# Patient Record
Sex: FEMALE | Race: WHITE | Employment: OTHER | URBAN - METROPOLITAN AREA
[De-identification: names, ages, dates, MRNs, and addresses within clinical notes are randomized per-mention and may not be internally consistent; named-entity substitution may affect disease eponyms.]

---

## 2021-02-11 ENCOUNTER — HOSPITAL ENCOUNTER (EMERGENCY)
Facility: HOSPITAL | Age: 71
Discharge: HOME | End: 2021-02-11
Attending: EMERGENCY MEDICINE
Payer: MEDICARE

## 2021-02-11 VITALS
SYSTOLIC BLOOD PRESSURE: 161 MMHG | TEMPERATURE: 98 F | WEIGHT: 180 LBS | RESPIRATION RATE: 18 BRPM | DIASTOLIC BLOOD PRESSURE: 90 MMHG | HEART RATE: 70 BPM | OXYGEN SATURATION: 98 %

## 2021-02-11 DIAGNOSIS — N39.0 URINARY TRACT INFECTION WITHOUT HEMATURIA, SITE UNSPECIFIED: Primary | ICD-10-CM

## 2021-02-11 LAB
BILIRUB UR QL: NEGATIVE
CLARITY UR: CLEAR
GLUCOSE BLDC GLUCOMTR-MCNC: 113 MG/DL (ref 70–99)
GLUCOSE UR-MCNC: 250 MG/DL
HGB UR QL STRIP.AUTO: NEGATIVE
NITRITE UR QL STRIP.AUTO: POSITIVE
PH UR: 5 [PH] (ref 5–8)
RBC #/AREA URNS AUTO: 0 /HPF
SP GR UR STRIP: 1.01 (ref 1–1.03)
UROBILINOGEN UR STRIP-ACNC: <2
WBC #/AREA URNS AUTO: 7 /HPF

## 2021-02-11 PROCEDURE — 81001 URINALYSIS AUTO W/SCOPE: CPT | Performed by: EMERGENCY MEDICINE

## 2021-02-11 PROCEDURE — 87086 URINE CULTURE/COLONY COUNT: CPT | Performed by: EMERGENCY MEDICINE

## 2021-02-11 PROCEDURE — 82962 GLUCOSE BLOOD TEST: CPT

## 2021-02-11 PROCEDURE — 99283 EMERGENCY DEPT VISIT LOW MDM: CPT

## 2021-02-11 RX ORDER — PHENAZOPYRIDINE HYDROCHLORIDE 100 MG/1
100 TABLET, FILM COATED ORAL 3 TIMES DAILY PRN
Qty: 6 TABLET | Refills: 0 | Status: SHIPPED | OUTPATIENT
Start: 2021-02-11 | End: 2021-02-18

## 2021-02-11 RX ORDER — CIPROFLOXACIN 250 MG/1
250 TABLET, FILM COATED ORAL 2 TIMES DAILY
Qty: 14 TABLET | Refills: 0 | Status: SHIPPED | OUTPATIENT
Start: 2021-02-11 | End: 2021-02-18

## 2021-02-11 NOTE — ED INITIAL ASSESSMENT (HPI)
Patient complains of abd bloating and pressure since today, states she has been taking ab for utis over the past three weeks

## 2021-02-12 NOTE — ED PROVIDER NOTES
Patient Seen in: [facility] AND [facility] Emergency Department      History   Patient presents with:  Urinary Symptoms  Abdomen/Flank Pain    Stated Complaint: uti    HPI/Subjective:   HPI    24-year-old female with history of hypertension and anxiety presents and dry, no rashes.   Musculoskeletal: neck is supple non tender        Extremities are symmetrical, full range of motion  Psychiatric: patient is oriented X 3, there is no agitation    ED Course     Labs Reviewed   URINALYSIS WITH CULTURE REFLEX - Abnormal

## 2025-03-06 ENCOUNTER — HOSPITAL ENCOUNTER (OUTPATIENT)
Dept: ULTRASOUND IMAGING | Facility: HOSPITAL | Age: 75
Discharge: HOME | End: 2025-03-06
Attending: RADIOLOGY
Payer: MEDICARE

## 2025-03-06 DIAGNOSIS — R60.9 EDEMA: ICD-10-CM

## 2025-03-06 PROCEDURE — 93971 EXTREMITY STUDY: CPT | Performed by: RADIOLOGY
